# Patient Record
Sex: MALE | Race: WHITE | NOT HISPANIC OR LATINO | Employment: FULL TIME | URBAN - NONMETROPOLITAN AREA
[De-identification: names, ages, dates, MRNs, and addresses within clinical notes are randomized per-mention and may not be internally consistent; named-entity substitution may affect disease eponyms.]

---

## 2017-08-24 ENCOUNTER — TRANSCRIBE ORDERS (OUTPATIENT)
Dept: MRI IMAGING | Facility: HOSPITAL | Age: 47
End: 2017-08-24

## 2017-08-24 DIAGNOSIS — R41.3 MEMORY LOSS: Primary | ICD-10-CM

## 2017-08-29 ENCOUNTER — HOSPITAL ENCOUNTER (OUTPATIENT)
Dept: MRI IMAGING | Facility: HOSPITAL | Age: 47
Discharge: HOME | End: 2017-08-29
Admitting: INTERNAL MEDICINE

## 2017-08-29 DIAGNOSIS — R41.3 MEMORY LOSS: ICD-10-CM

## 2017-08-29 PROCEDURE — 70551 MRI BRAIN STEM W/O DYE: CPT

## 2020-07-29 ENCOUNTER — TELEPHONE (OUTPATIENT)
Dept: SURGERY | Facility: CLINIC | Age: 50
End: 2020-07-29

## 2020-07-29 NOTE — TELEPHONE ENCOUNTER
Dr Rosario office called requesting patient have screening colonoscopy in [facility] will fax records